# Patient Record
Sex: FEMALE | Race: WHITE | NOT HISPANIC OR LATINO | Employment: UNEMPLOYED | URBAN - METROPOLITAN AREA
[De-identification: names, ages, dates, MRNs, and addresses within clinical notes are randomized per-mention and may not be internally consistent; named-entity substitution may affect disease eponyms.]

---

## 2022-12-23 ENCOUNTER — ANESTHESIA (OUTPATIENT)
Dept: OBGYN | Facility: MEDICAL CENTER | Age: 32
End: 2022-12-23
Payer: MEDICAID

## 2022-12-23 ENCOUNTER — ANESTHESIA EVENT (OUTPATIENT)
Dept: OBGYN | Facility: MEDICAL CENTER | Age: 32
End: 2022-12-23
Payer: MEDICAID

## 2022-12-23 ENCOUNTER — HOSPITAL ENCOUNTER (INPATIENT)
Facility: MEDICAL CENTER | Age: 32
LOS: 3 days | End: 2022-12-26
Attending: OBSTETRICS & GYNECOLOGY | Admitting: OBSTETRICS & GYNECOLOGY
Payer: MEDICAID

## 2022-12-23 DIAGNOSIS — G89.18 ACUTE POSTOPERATIVE PAIN: ICD-10-CM

## 2022-12-23 PROBLEM — Z98.891 STATUS POST REPEAT LOW TRANSVERSE CESAREAN SECTION: Status: ACTIVE | Noted: 2022-12-23

## 2022-12-23 LAB
BASOPHILS # BLD AUTO: 0.5 % (ref 0–1.8)
BASOPHILS # BLD: 0.04 K/UL (ref 0–0.12)
EOSINOPHIL # BLD AUTO: 0.02 K/UL (ref 0–0.51)
EOSINOPHIL NFR BLD: 0.2 % (ref 0–6.9)
ERYTHROCYTE [DISTWIDTH] IN BLOOD BY AUTOMATED COUNT: 53 FL (ref 35.9–50)
HCT VFR BLD AUTO: 43.7 % (ref 37–47)
HGB BLD-MCNC: 14.6 G/DL (ref 12–16)
HOLDING TUBE BB 8507: NORMAL
IMM GRANULOCYTES # BLD AUTO: 0.03 K/UL (ref 0–0.11)
IMM GRANULOCYTES NFR BLD AUTO: 0.4 % (ref 0–0.9)
LYMPHOCYTES # BLD AUTO: 0.99 K/UL (ref 1–4.8)
LYMPHOCYTES NFR BLD: 12.1 % (ref 22–41)
MCH RBC QN AUTO: 33.6 PG (ref 27–33)
MCHC RBC AUTO-ENTMCNC: 33.4 G/DL (ref 33.6–35)
MCV RBC AUTO: 100.7 FL (ref 81.4–97.8)
MONOCYTES # BLD AUTO: 0.47 K/UL (ref 0–0.85)
MONOCYTES NFR BLD AUTO: 5.7 % (ref 0–13.4)
NEUTROPHILS # BLD AUTO: 6.63 K/UL (ref 2–7.15)
NEUTROPHILS NFR BLD: 81.1 % (ref 44–72)
NRBC # BLD AUTO: 0 K/UL
NRBC BLD-RTO: 0 /100 WBC
PLATELET # BLD AUTO: 168 K/UL (ref 164–446)
PMV BLD AUTO: 12 FL (ref 9–12.9)
RBC # BLD AUTO: 4.34 M/UL (ref 4.2–5.4)
T PALLIDUM AB SER QL IA: NORMAL
WBC # BLD AUTO: 8.2 K/UL (ref 4.8–10.8)

## 2022-12-23 PROCEDURE — 160035 HCHG PACU - 1ST 60 MINS PHASE I: Performed by: OBSTETRICS & GYNECOLOGY

## 2022-12-23 PROCEDURE — 86780 TREPONEMA PALLIDUM: CPT

## 2022-12-23 PROCEDURE — 700111 HCHG RX REV CODE 636 W/ 250 OVERRIDE (IP)

## 2022-12-23 PROCEDURE — 700101 HCHG RX REV CODE 250: Performed by: ANESTHESIOLOGY

## 2022-12-23 PROCEDURE — 700111 HCHG RX REV CODE 636 W/ 250 OVERRIDE (IP): Performed by: OBSTETRICS & GYNECOLOGY

## 2022-12-23 PROCEDURE — 700111 HCHG RX REV CODE 636 W/ 250 OVERRIDE (IP): Performed by: NURSE PRACTITIONER

## 2022-12-23 PROCEDURE — 36415 COLL VENOUS BLD VENIPUNCTURE: CPT

## 2022-12-23 PROCEDURE — 160041 HCHG SURGERY MINUTES - EA ADDL 1 MIN LEVEL 4: Performed by: OBSTETRICS & GYNECOLOGY

## 2022-12-23 PROCEDURE — 770002 HCHG ROOM/CARE - OB PRIVATE (112)

## 2022-12-23 PROCEDURE — 59514 CESAREAN DELIVERY ONLY: CPT | Performed by: OBSTETRICS & GYNECOLOGY

## 2022-12-23 PROCEDURE — 700102 HCHG RX REV CODE 250 W/ 637 OVERRIDE(OP): Performed by: ANESTHESIOLOGY

## 2022-12-23 PROCEDURE — 160048 HCHG OR STATISTICAL LEVEL 1-5: Performed by: OBSTETRICS & GYNECOLOGY

## 2022-12-23 PROCEDURE — 160029 HCHG SURGERY MINUTES - 1ST 30 MINS LEVEL 4: Performed by: OBSTETRICS & GYNECOLOGY

## 2022-12-23 PROCEDURE — 700111 HCHG RX REV CODE 636 W/ 250 OVERRIDE (IP): Performed by: ANESTHESIOLOGY

## 2022-12-23 PROCEDURE — 99282 EMERGENCY DEPT VISIT SF MDM: CPT

## 2022-12-23 PROCEDURE — 700105 HCHG RX REV CODE 258: Performed by: ANESTHESIOLOGY

## 2022-12-23 PROCEDURE — 700102 HCHG RX REV CODE 250 W/ 637 OVERRIDE(OP)

## 2022-12-23 PROCEDURE — C1755 CATHETER, INTRASPINAL: HCPCS | Performed by: OBSTETRICS & GYNECOLOGY

## 2022-12-23 PROCEDURE — 85025 COMPLETE CBC W/AUTO DIFF WBC: CPT

## 2022-12-23 PROCEDURE — 01961 ANES CESAREAN DELIVERY ONLY: CPT | Performed by: ANESTHESIOLOGY

## 2022-12-23 PROCEDURE — 160002 HCHG RECOVERY MINUTES (STAT): Performed by: OBSTETRICS & GYNECOLOGY

## 2022-12-23 PROCEDURE — A9270 NON-COVERED ITEM OR SERVICE: HCPCS

## 2022-12-23 PROCEDURE — 160009 HCHG ANES TIME/MIN: Performed by: OBSTETRICS & GYNECOLOGY

## 2022-12-23 PROCEDURE — A9270 NON-COVERED ITEM OR SERVICE: HCPCS | Performed by: ANESTHESIOLOGY

## 2022-12-23 RX ORDER — MEPERIDINE HYDROCHLORIDE 25 MG/ML
25 INJECTION INTRAMUSCULAR; INTRAVENOUS; SUBCUTANEOUS ONCE
Status: COMPLETED | OUTPATIENT
Start: 2022-12-23 | End: 2022-12-23

## 2022-12-23 RX ORDER — DIPHENHYDRAMINE HCL 25 MG
25 TABLET ORAL EVERY 6 HOURS PRN
Status: DISCONTINUED | OUTPATIENT
Start: 2022-12-23 | End: 2022-12-26 | Stop reason: HOSPADM

## 2022-12-23 RX ORDER — SODIUM CHLORIDE, SODIUM GLUCONATE, SODIUM ACETATE, POTASSIUM CHLORIDE AND MAGNESIUM CHLORIDE 526; 502; 368; 37; 30 MG/100ML; MG/100ML; MG/100ML; MG/100ML; MG/100ML
INJECTION, SOLUTION INTRAVENOUS
Status: DISCONTINUED | OUTPATIENT
Start: 2022-12-23 | End: 2022-12-23 | Stop reason: SURG

## 2022-12-23 RX ORDER — DEXAMETHASONE SODIUM PHOSPHATE 4 MG/ML
INJECTION, SOLUTION INTRA-ARTICULAR; INTRALESIONAL; INTRAMUSCULAR; INTRAVENOUS; SOFT TISSUE PRN
Status: DISCONTINUED | OUTPATIENT
Start: 2022-12-23 | End: 2022-12-23 | Stop reason: SURG

## 2022-12-23 RX ORDER — CEFAZOLIN SODIUM 1 G/3ML
2 INJECTION, POWDER, FOR SOLUTION INTRAMUSCULAR; INTRAVENOUS ONCE
Status: COMPLETED | OUTPATIENT
Start: 2022-12-23 | End: 2022-12-23

## 2022-12-23 RX ORDER — OXYCODONE HYDROCHLORIDE 5 MG/1
5 TABLET ORAL EVERY 4 HOURS PRN
Status: DISCONTINUED | OUTPATIENT
Start: 2022-12-23 | End: 2022-12-25

## 2022-12-23 RX ORDER — SCOLOPAMINE TRANSDERMAL SYSTEM 1 MG/1
1 PATCH, EXTENDED RELEASE TRANSDERMAL
Status: DISCONTINUED | OUTPATIENT
Start: 2022-12-23 | End: 2022-12-26 | Stop reason: HOSPADM

## 2022-12-23 RX ORDER — SODIUM CHLORIDE, SODIUM LACTATE, POTASSIUM CHLORIDE, CALCIUM CHLORIDE 600; 310; 30; 20 MG/100ML; MG/100ML; MG/100ML; MG/100ML
INJECTION, SOLUTION INTRAVENOUS CONTINUOUS
Status: DISCONTINUED | OUTPATIENT
Start: 2022-12-23 | End: 2022-12-26 | Stop reason: HOSPADM

## 2022-12-23 RX ORDER — OXYTOCIN 10 [USP'U]/ML
10 INJECTION, SOLUTION INTRAMUSCULAR; INTRAVENOUS
Status: COMPLETED | OUTPATIENT
Start: 2022-12-23 | End: 2022-12-23

## 2022-12-23 RX ORDER — HALOPERIDOL 5 MG/ML
1 INJECTION INTRAMUSCULAR
Status: DISCONTINUED | OUTPATIENT
Start: 2022-12-23 | End: 2022-12-23 | Stop reason: HOSPADM

## 2022-12-23 RX ORDER — BUPIVACAINE HYDROCHLORIDE 7.5 MG/ML
INJECTION, SOLUTION INTRASPINAL
Status: COMPLETED | OUTPATIENT
Start: 2022-12-23 | End: 2022-12-23

## 2022-12-23 RX ORDER — DIPHENHYDRAMINE HYDROCHLORIDE 50 MG/ML
25 INJECTION INTRAMUSCULAR; INTRAVENOUS EVERY 6 HOURS PRN
Status: DISCONTINUED | OUTPATIENT
Start: 2022-12-23 | End: 2022-12-26 | Stop reason: HOSPADM

## 2022-12-23 RX ORDER — SODIUM CHLORIDE, SODIUM LACTATE, POTASSIUM CHLORIDE, CALCIUM CHLORIDE 600; 310; 30; 20 MG/100ML; MG/100ML; MG/100ML; MG/100ML
INJECTION, SOLUTION INTRAVENOUS PRN
Status: DISCONTINUED | OUTPATIENT
Start: 2022-12-23 | End: 2022-12-26 | Stop reason: HOSPADM

## 2022-12-23 RX ORDER — SODIUM CHLORIDE, SODIUM LACTATE, POTASSIUM CHLORIDE, CALCIUM CHLORIDE 600; 310; 30; 20 MG/100ML; MG/100ML; MG/100ML; MG/100ML
INJECTION, SOLUTION INTRAVENOUS ONCE
Status: ACTIVE | OUTPATIENT
Start: 2022-12-23 | End: 2022-12-24

## 2022-12-23 RX ORDER — IBUPROFEN 800 MG/1
800 TABLET ORAL EVERY 8 HOURS
Status: COMPLETED | OUTPATIENT
Start: 2022-12-23 | End: 2022-12-26

## 2022-12-23 RX ORDER — ONDANSETRON 2 MG/ML
4 INJECTION INTRAMUSCULAR; INTRAVENOUS EVERY 4 HOURS PRN
Status: DISCONTINUED | OUTPATIENT
Start: 2022-12-23 | End: 2022-12-23

## 2022-12-23 RX ORDER — DOCUSATE SODIUM 100 MG/1
100 CAPSULE, LIQUID FILLED ORAL 2 TIMES DAILY PRN
Status: DISCONTINUED | OUTPATIENT
Start: 2022-12-23 | End: 2022-12-26 | Stop reason: HOSPADM

## 2022-12-23 RX ORDER — METOCLOPRAMIDE HYDROCHLORIDE 5 MG/ML
10 INJECTION INTRAMUSCULAR; INTRAVENOUS ONCE
Status: DISCONTINUED | OUTPATIENT
Start: 2022-12-23 | End: 2022-12-23 | Stop reason: HOSPADM

## 2022-12-23 RX ORDER — DIPHENHYDRAMINE HYDROCHLORIDE 50 MG/ML
12.5 INJECTION INTRAMUSCULAR; INTRAVENOUS
Status: DISCONTINUED | OUTPATIENT
Start: 2022-12-23 | End: 2022-12-23 | Stop reason: HOSPADM

## 2022-12-23 RX ORDER — KETOROLAC TROMETHAMINE 30 MG/ML
30 INJECTION, SOLUTION INTRAMUSCULAR; INTRAVENOUS ONCE
Status: COMPLETED | OUTPATIENT
Start: 2022-12-23 | End: 2022-12-23

## 2022-12-23 RX ORDER — ONDANSETRON 2 MG/ML
INJECTION INTRAMUSCULAR; INTRAVENOUS
Status: COMPLETED
Start: 2022-12-23 | End: 2022-12-23

## 2022-12-23 RX ORDER — ONDANSETRON 2 MG/ML
4 INJECTION INTRAMUSCULAR; INTRAVENOUS EVERY 6 HOURS PRN
Status: DISCONTINUED | OUTPATIENT
Start: 2022-12-23 | End: 2022-12-26 | Stop reason: HOSPADM

## 2022-12-23 RX ORDER — ONDANSETRON 4 MG/1
4 TABLET, ORALLY DISINTEGRATING ORAL ONCE
Status: COMPLETED | OUTPATIENT
Start: 2022-12-23 | End: 2022-12-23

## 2022-12-23 RX ORDER — MORPHINE SULFATE 0.5 MG/ML
INJECTION, SOLUTION EPIDURAL; INTRATHECAL; INTRAVENOUS
Status: DISCONTINUED | OUTPATIENT
Start: 2022-12-23 | End: 2022-12-23

## 2022-12-23 RX ORDER — PHENYLEPHRINE HYDROCHLORIDE 10 MG/ML
INJECTION, SOLUTION INTRAMUSCULAR; INTRAVENOUS; SUBCUTANEOUS PRN
Status: DISCONTINUED | OUTPATIENT
Start: 2022-12-23 | End: 2022-12-23 | Stop reason: SURG

## 2022-12-23 RX ORDER — ACETAMINOPHEN 500 MG
1000 TABLET ORAL EVERY 6 HOURS PRN
Status: DISCONTINUED | OUTPATIENT
Start: 2022-12-26 | End: 2022-12-26 | Stop reason: HOSPADM

## 2022-12-23 RX ORDER — IBUPROFEN 800 MG/1
800 TABLET ORAL EVERY 8 HOURS PRN
Status: DISCONTINUED | OUTPATIENT
Start: 2022-12-26 | End: 2022-12-26 | Stop reason: HOSPADM

## 2022-12-23 RX ORDER — OXYCODONE HYDROCHLORIDE 10 MG/1
10 TABLET ORAL EVERY 4 HOURS PRN
Status: DISCONTINUED | OUTPATIENT
Start: 2022-12-23 | End: 2022-12-25

## 2022-12-23 RX ORDER — CITRIC ACID/SODIUM CITRATE 334-500MG
30 SOLUTION, ORAL ORAL ONCE
Status: COMPLETED | OUTPATIENT
Start: 2022-12-23 | End: 2022-12-23

## 2022-12-23 RX ORDER — ONDANSETRON 4 MG/1
4 TABLET, ORALLY DISINTEGRATING ORAL EVERY 6 HOURS PRN
Status: DISCONTINUED | OUTPATIENT
Start: 2022-12-23 | End: 2022-12-26 | Stop reason: HOSPADM

## 2022-12-23 RX ORDER — ONDANSETRON 2 MG/ML
4 INJECTION INTRAMUSCULAR; INTRAVENOUS
Status: DISCONTINUED | OUTPATIENT
Start: 2022-12-23 | End: 2022-12-23 | Stop reason: HOSPADM

## 2022-12-23 RX ORDER — SODIUM CHLORIDE, SODIUM GLUCONATE, SODIUM ACETATE, POTASSIUM CHLORIDE AND MAGNESIUM CHLORIDE 526; 502; 368; 37; 30 MG/100ML; MG/100ML; MG/100ML; MG/100ML; MG/100ML
1500 INJECTION, SOLUTION INTRAVENOUS ONCE
Status: COMPLETED | OUTPATIENT
Start: 2022-12-23 | End: 2022-12-23

## 2022-12-23 RX ORDER — ACETAMINOPHEN 500 MG
1000 TABLET ORAL EVERY 6 HOURS
Status: DISCONTINUED | OUTPATIENT
Start: 2022-12-23 | End: 2022-12-26 | Stop reason: HOSPADM

## 2022-12-23 RX ADMIN — SODIUM CHLORIDE, SODIUM GLUCONATE, SODIUM ACETATE, POTASSIUM CHLORIDE AND MAGNESIUM CHLORIDE 1500 ML: 526; 502; 368; 37; 30 INJECTION, SOLUTION INTRAVENOUS at 11:27

## 2022-12-23 RX ADMIN — ACETAMINOPHEN 1000 MG: 500 TABLET ORAL at 23:57

## 2022-12-23 RX ADMIN — OXYCODONE HYDROCHLORIDE 10 MG: 10 TABLET ORAL at 17:55

## 2022-12-23 RX ADMIN — FAMOTIDINE 20 MG: 10 INJECTION, SOLUTION INTRAVENOUS at 09:46

## 2022-12-23 RX ADMIN — ACETAMINOPHEN 1000 MG: 500 TABLET ORAL at 17:56

## 2022-12-23 RX ADMIN — SCOPOLAMINE 1 PATCH: 1.5 PATCH, EXTENDED RELEASE TRANSDERMAL at 11:27

## 2022-12-23 RX ADMIN — FENTANYL CITRATE 100 MCG: 50 INJECTION INTRAMUSCULAR; INTRAVENOUS at 10:35

## 2022-12-23 RX ADMIN — EPHEDRINE SULFATE 10 MG: 50 INJECTION INTRAMUSCULAR; INTRAVENOUS; SUBCUTANEOUS at 13:11

## 2022-12-23 RX ADMIN — PHENYLEPHRINE HYDROCHLORIDE 100 MCG: 10 INJECTION INTRAVENOUS at 13:17

## 2022-12-23 RX ADMIN — ONDANSETRON 4 MG: 2 INJECTION INTRAMUSCULAR; INTRAVENOUS at 09:23

## 2022-12-23 RX ADMIN — FENTANYL CITRATE 100 MCG: 50 INJECTION, SOLUTION INTRAMUSCULAR; INTRAVENOUS at 09:23

## 2022-12-23 RX ADMIN — OXYTOCIN 10 UNITS: 10 INJECTION, SOLUTION INTRAMUSCULAR; INTRAVENOUS at 15:05

## 2022-12-23 RX ADMIN — PHENYLEPHRINE HYDROCHLORIDE 100 MCG: 10 INJECTION INTRAVENOUS at 13:15

## 2022-12-23 RX ADMIN — CEFAZOLIN 2 G: 330 INJECTION, POWDER, FOR SOLUTION INTRAMUSCULAR; INTRAVENOUS at 13:14

## 2022-12-23 RX ADMIN — DEXAMETHASONE SODIUM PHOSPHATE 4 MG: 4 INJECTION, SOLUTION INTRA-ARTICULAR; INTRALESIONAL; INTRAMUSCULAR; INTRAVENOUS; SOFT TISSUE at 13:52

## 2022-12-23 RX ADMIN — PHENYLEPHRINE HYDROCHLORIDE 200 MCG: 10 INJECTION INTRAVENOUS at 13:36

## 2022-12-23 RX ADMIN — KETOROLAC TROMETHAMINE 30 MG: 30 INJECTION, SOLUTION INTRAMUSCULAR; INTRAVENOUS at 14:47

## 2022-12-23 RX ADMIN — PHENYLEPHRINE HYDROCHLORIDE 200 MCG: 10 INJECTION INTRAVENOUS at 13:19

## 2022-12-23 RX ADMIN — SODIUM CHLORIDE, SODIUM GLUCONATE, SODIUM ACETATE, POTASSIUM CHLORIDE AND MAGNESIUM CHLORIDE: 526; 502; 368; 37; 30 INJECTION, SOLUTION INTRAVENOUS at 13:04

## 2022-12-23 RX ADMIN — BUPIVACAINE HYDROCHLORIDE IN DEXTROSE 1.8 ML: 7.5 INJECTION, SOLUTION SUBARACHNOID at 13:06

## 2022-12-23 RX ADMIN — MEPERIDINE HYDROCHLORIDE 25 MG: 25 INJECTION INTRAMUSCULAR; INTRAVENOUS; SUBCUTANEOUS at 15:04

## 2022-12-23 RX ADMIN — FENTANYL CITRATE 100 MCG: 50 INJECTION INTRAMUSCULAR; INTRAVENOUS at 12:11

## 2022-12-23 RX ADMIN — ONDANSETRON 4 MG: 4 TABLET, ORALLY DISINTEGRATING ORAL at 07:11

## 2022-12-23 RX ADMIN — OXYTOCIN 500 ML: 10 INJECTION, SOLUTION INTRAMUSCULAR; INTRAVENOUS at 13:31

## 2022-12-23 RX ADMIN — FENTANYL CITRATE 100 MCG: 50 INJECTION INTRAMUSCULAR; INTRAVENOUS at 09:23

## 2022-12-23 RX ADMIN — SODIUM CITRATE AND CITRIC ACID MONOHYDRATE 30 ML: 500; 334 SOLUTION ORAL at 09:46

## 2022-12-23 RX ADMIN — PHENYLEPHRINE HYDROCHLORIDE 100 MCG: 10 INJECTION INTRAVENOUS at 13:14

## 2022-12-23 RX ADMIN — IBUPROFEN 800 MG: 800 TABLET, FILM COATED ORAL at 21:06

## 2022-12-23 ASSESSMENT — LIFESTYLE VARIABLES
EVER_SMOKED: NEVER
TOTAL SCORE: 0
ALCOHOL_USE: NO
ON A TYPICAL DAY WHEN YOU DRINK ALCOHOL HOW MANY DRINKS DO YOU HAVE: 0
HOW MANY TIMES IN THE PAST YEAR HAVE YOU HAD 5 OR MORE DRINKS IN A DAY: 0
HAVE YOU EVER FELT YOU SHOULD CUT DOWN ON YOUR DRINKING: NO
CONSUMPTION TOTAL: NEGATIVE
EVER HAD A DRINK FIRST THING IN THE MORNING TO STEADY YOUR NERVES TO GET RID OF A HANGOVER: NO
AVERAGE NUMBER OF DAYS PER WEEK YOU HAVE A DRINK CONTAINING ALCOHOL: 0
EVER FELT BAD OR GUILTY ABOUT YOUR DRINKING: NO
TOTAL SCORE: 0
HAVE PEOPLE ANNOYED YOU BY CRITICIZING YOUR DRINKING: NO
TOTAL SCORE: 0

## 2022-12-23 ASSESSMENT — PATIENT HEALTH QUESTIONNAIRE - PHQ9
2. FEELING DOWN, DEPRESSED, IRRITABLE, OR HOPELESS: NOT AT ALL
SUM OF ALL RESPONSES TO PHQ9 QUESTIONS 1 AND 2: 0
1. LITTLE INTEREST OR PLEASURE IN DOING THINGS: NOT AT ALL

## 2022-12-23 ASSESSMENT — COPD QUESTIONNAIRES
DURING THE PAST 4 WEEKS HOW MUCH DID YOU FEEL SHORT OF BREATH: NONE/LITTLE OF THE TIME
DO YOU EVER COUGH UP ANY MUCUS OR PHLEGM?: NO/ONLY WITH OCCASIONAL COLDS OR INFECTIONS
IN THE PAST 12 MONTHS DO YOU DO LESS THAN YOU USED TO BECAUSE OF YOUR BREATHING PROBLEMS: DISAGREE/UNSURE
HAVE YOU SMOKED AT LEAST 100 CIGARETTES IN YOUR ENTIRE LIFE: NO/DON'T KNOW

## 2022-12-23 ASSESSMENT — PAIN SCALES - GENERAL: PAINLEVEL: 5 - MODERATE PAIN

## 2022-12-23 ASSESSMENT — PAIN DESCRIPTION - PAIN TYPE
TYPE: SURGICAL PAIN
TYPE: ACUTE PAIN
TYPE: SURGICAL PAIN

## 2022-12-23 ASSESSMENT — FIBROSIS 4 INDEX: FIB4 SCORE: 0.72

## 2022-12-23 NOTE — PROGRESS NOTES
0905- Report received from SHERI Voss. POC discussed. Pt stable at this time.    0945- Pt reports adverse effects when taking reglan, Dr. Chavira notified and gave orders to no give the medication.    1030- Dr. Levine gave okay to give another dose of 100 mcg of fentanyl.     1115- Orders from Dr. Chavira to apply scopolamine patch to help with pt nausea. Patch applied behind the Left ear.    1207- Dr. Chavira gave okay for pt to have another dose of 100 mcg of fentanyl.     1335- C/S delivery of viable baby girl, APGARS 4/9. Cord gases collected and sent.     1500- Dr. Chavira gave orders for 30 mg toradol and 25 mg demerol IV at this time.     1506- Dr. Levine updated related to unable to get a new IV. Dr. Levine said it was okay at this time to not run pit IV and to give 10 Units IM.    1700- Pt transferred to postpartum via gurney. Report given to SHERI Burns. POC discussed. Pt stable. Cuddles and bands verified.

## 2022-12-23 NOTE — OP REPORT
PREOPERATIVE DIAGNOSIS:   IUP @ 40 1/7 weeks   Previous c/section  Latent labor with no cervical change  Nausea and vomiting with diarrhea  Poor tolerance of CTXs  Desires to proceed with repeat c/section    POSTOPERATIVE DIAGNOSIS:  Same    PROCEDURE: Repeat Low Transverse  Section via Pfannensteil    SURGEON: Amrita Levine MD    ASSISTANT: Dr michelle Snow    ANESTHESIA: Dr Maged Chavira/ Spinal    COMPLICATIONS: none    EBL:  500 cc    FLUIDS: 1000 cc LR    URINE OUTPUT: 300 cc clear urine    INDICATIONS: see above    FINDINGS: Viable female infant in vertex presentation with clear fluid, apgars 4 and 9, weight 7lbs 9oz. Normal uterus, tubes, ovaries.     PROCEDURE IN DETAIL: The patient was taken to the operating room where  spinal anesthesia was found to be adequate.  She was then prepped and draped in the normal sterile fashion in the dorsal supine position with a leftward hip tilt.  A Pfannenstiel skin incision was then made with the scalpel and carried through to the underlying layer of fascia, which was nicked in the midline and the incision was extended laterally with the Whitney scissors.  The superior aspect of the fascial incision was then grasped with Kocher clamps, elevated, and the underlying rectus muscles dissected off sharply.  Attention was then turned to the inferior aspect of this incision which, in a similar fashion, was grasped, tented up with Kocher clamps, and the rectus muscle was dissected off sharply.  The rectus muscles were then  in the midline, and the peritoneum was identified, tented up, and entered sharply with the Metzenbaum scissors.  The peritoneal incision was then extended superiorly and inferiorly with good visualization of the bladder.  The Arnold-O retractor was placed, checked for safe positioning, and tightened into place  and the vesicouterine peritoneum identified, grasped with pickups, and entered sharply with the Metzenbaum scissors.  This incision was  then extended laterally and the bladder flap was created digitally.  The lower uterine segment incised in a transverse fashion with the scalpel.  The uterine incision was then extended manually in a cephalocaudad direction with fingers.  The infant's head was delivered atraumatically.  The remainder of the infant was delivered without difficulty.  The infant's nose and mouth were bulb suctioned on the field.  The infant had adequate tone and movement and opening of eyes on abdomen. The cord was clamped and cut after approximately 30 seconds.  The infant was then handed off to the awaiting attendant.    The placenta was then removed manually, the uterus exteriorized, and cleared of all clot and debris with a dry laparotomy sponge.  The uterine incision was repaired with 1-0 chromic in a running, locked fashion.  A second layer of the same suture was used to obtain excellent hemostasis.  The uterus was returned to the abdomen and the gutters were cleared of all clot and debris.  The peritoneum and rectus muscles were fused and closed together was closed with 3-0 Vicryl.  The fascia was approximated with 0 Vicryl in a running fashion.  The subcuticular fat was irrigated.  Yunior's fascia was closed with interrupted sutures of 2-0 Vicryl.  The skin was closed with 4-0 monocryl on a Juan needle. Mepelex dressing applied.  The patient tolerated the procedure well.  Sponge lap and needle counts were correct x3.  The patient was taken to the recovery room in stable condition.

## 2022-12-23 NOTE — ANESTHESIA PREPROCEDURE EVALUATION
Case: [id number] Date/Time: 22 1000    Procedure:  SECTION, REPEAT (Abdomen)    Anesthesia type: Spinal    Pre-op diagnosis: Hx of previous c section    Location: [facility] OR 02 / SURGERY LABOR AND DELIVERY    Surgeons: Amrita Levine M.D.          Relevant Problems   OB   (positive) Status post repeat low transverse  section      Other   (positive) Hyperemesis arising during pregnancy       Physical Exam    Airway   Mallampati: II  TM distance: >3 FB  Neck ROM: full       Cardiovascular - normal exam  Rhythm: regular  Rate: normal  (-) murmur     Dental - normal exam           Pulmonary - normal exam  Breath sounds clear to auscultation     Abdominal    Neurological - normal exam                 Anesthesia Plan    ASA 2       Plan - spinal   Neuraxial block will be primary anesthetic                  Pertinent diagnostic labs and testing reviewed    Informed Consent:    Anesthetic plan and risks discussed with patient.

## 2022-12-23 NOTE — ED PROVIDER NOTES
"S: Pt is a 32 y.o.  at Unknown with Estimated Date of Delivery: None noted. who presents to triage c/o RUC's since 030.  Denies VB or LOF.  Reports good FM. PNC has been at [facility]. She has been talking to her providers about a TOLAC, but also was informed that CMG and CTH do not support TOLAC. Hx of  section x  for FITL and FTD. No operative report, states baby was [id number]. Denies any other complications. She came to [facility] for a desired TOLAC. Will discuss with oncvalery STEWART for POC. She is not opposed to a repeat  section either.    O: /71   Pulse 80   Temp 36.1 °C (96.9 °F) (Temporal)   Ht 1.676 m (5' 6\")   Wt 90.7 kg (200 lb)   LMP 2022   BMI 32.28 kg/m²          NST: Done and read by me         Indication: Term IUP, rule out labor. Prev C/S x 1       FHR: 125       Variability: moderate       Acclerations: present       Decelerations: absent       Reactive: yes         [place]: irreg UCs, every 2-4       SVE: ft/80/-3, monitored x 1 hour, and unchanged.    After recheck, patient became very uncomfortable with nausea and vomiting.     Reviewed indications for admission and POC  As her PNC has been with another provider, and I do not have record of her previous , I do not recommend TOLAC or augmentation of labor.  After discussion, she is agreeable to repeat . Will discuss with oncoming provider         A/P  Patient Active Problem List    Diagnosis Date Noted    GBS (group B Streptococcus carrier), +RV culture, currently pregnant 2014    Hypokalemia 2014    Diarrhea 2014    N&V (nausea and vomiting) 2014    Hyperemesis gravidarum 2014    Hyperemesis complicating pregnancy, antepartum 2014    Hyperemesis arising during pregnancy 2014     Report to Dr Levine and Dr Snow- will resume care and decide disposition.     1.  IUP @ 40w1d per records  2.  Reactive NST.  3.  Early labor, hx of c/s x 1  4.  " Nausea and vomiting in pregnancy  5.  Defer to Dr Levine and Edy for disposition.    Toshia Zelaya CNM, APRN

## 2022-12-23 NOTE — H&P
History and Physical      Judy Darden is a 32 y.o. female  with EDC 22 by 13 week US and EGA 40 1/7 weeks  Presents with strong, regular uterine CTXs and nausea/vomiting and diarrhea which started on hospital presentation    Subjective:   positive  For CTXS.   positive Feels pain   negative for LOF  negative for vaginal bleeding.   positive for fetal movement    ROS: A comprehensive review of systems was negative. She denies S&S of PIH. Reports n/ving and diarrhea and painful/intolerable uterine CTXs. She reports active FM and denies LOF or vaginal bleeding    Past Medical History:   Diagnosis Date    Anxiety     Indigestion     No active medical problems     Patient denies medical problems     Pregnant     Psychiatric disorder     anxiety     Past Surgical History:   Procedure Laterality Date    PB KNEE SCOPE,REMV LOOSE BODY  oct 2007    OTHER  shingles , intestinal parasite     OTHER ORTHOPEDIC SURGERY      right knee   PRevious c/section for FTP (only dilated to 3 cm)    OB History:  1 X SAB  1 X term c/section of 7lb 11oz for FTP and possible fetal intolerance to labor    Allergies: Penicillins    Current Facility-Administered Medications:     oxytocin (Pitocin) 0.02 Units/mL in LR infusion, , , ,     electrolyte-A (PLASMALYTE-A) infusion 1,500 mL, 1,500 mL, Intravenous, Once, Alvin Chavira M.D.    Na citrate-citric acid (BICITRA) 500-334 MG/5ML solution 30 mL, 30 mL, Oral, Once, Alvin Chavira M.D.    famotidine (PEPCID) injection 20 mg, 20 mg, Intravenous, Once, Alvin Chavira M.D.    metoclopramide (REGLAN) injection 10 mg, 10 mg, Intravenous, Once, Alvin Chavira M.D.    lactated ringers (LR) infusion, , Intravenous, Continuous, Amrita Levine M.D.    ceFAZolin (ANCEF) injection 2 g, 2 g, Intravenous, Once, Amrita Levine M.D.    Prenatal care with [facility] starting at 13 with following problems:    Patient Active Problem List    Diagnosis Date Noted    Status post repeat low  "transverse  section 2022    GBS (group B Streptococcus carrier), +RV culture, currently pregnant 2014    Hypokalemia 2014    Diarrhea 2014    N&V (nausea and vomiting) 2014    Hyperemesis gravidarum 2014    Hyperemesis complicating pregnancy, antepartum 2014    Hyperemesis arising during pregnancy 2014         Objective:      /71   Pulse 80   Temp 36.1 °C (96.9 °F) (Temporal)   Ht 1.676 m (5' 6\")   Wt 90.7 kg (200 lb)     General:   Uncomfortable with emesis   Skin:   normal   HEENT:  PERRLA   Lungs:   CTA bilateral   Heart:   S1, S2 normal, no murmur, click, rub or gallop, regular rate and rhythm, chest is clear without rales or wheezing   Abdomen:   gravid, NT   EFW:  8 lbs       FHT:  120s BPM, + accels, moderate variability, no decels       Presentations: Cephalic   Cervix:     Dilation: FT- exam per RN and repeated after an hour    Effacement: Long    Station:  -3    Consistency: Firm    Position: Middle     Lab Review  Lab:   Blood type: O positive     Recent Results (from the past 5880 hour(s))   POCT Pregnancy    Collection Time: 22  9:40 AM   Result Value Ref Range    POC Urine Pregnancy Test Positive Negative    Internal Control Positive Positive     Internal Control Negative Negative    POCT Urinalysis    Collection Time: 22  9:40 AM   Result Value Ref Range    POC Color Yellow Negative    POC Appearance Clear Negative    POC Leukocyte Esterase Negative Negative    POC Bilirubin Negative Negative mg/dL    POC Urobiligen 0.2 E.U. dl Negative (0.2) mg/dL    POC Protein Negative Negative mg/dL    POC Urine PH 7.5 5.0 - 8.0    POC Blood Negative Negative    POC Specific Gravity 1.020 <1.005 - >1.030    POC Ketones Negative Negative mg/dL    POC Nitrites Negative Negative    POC Glucose Negative Negative mg/dL   CBC WITH PLATELET COUNT    Collection Time: 22 12:35 PM   Result Value Ref Range    Leukocytes, Absolute 7.2 3.7 - " 10.6 x10E3/uL    RBC 3.90 (L) 4.19 - 5.21 x10e6/uL    Hemoglobin 13.5 12.3 - 16.0 g/dL    Hematocrit 39.4 36.0 - 47.0 %    .9 (H) 81.0 - 99.0 fL    MCH 34.7 (H) 28.0 - 33.8 pg    MCHC 34.4 33.1 - 36.5 g/dL    RDW 12.6 11.8 - 14.0 %    Platelet Count 203 146 - 390 x10E3/uL    MPV 9.0 6.4 - 10.2 fL   HEP C VIRUS ANTIBODY    Collection Time: 06/29/22 12:54 PM   Result Value Ref Range    Hepatitis C virus IgG Ab (Units/volume) in Serum by Immunoassay External Nonreactive Nonreactive   HEPATITIS B VIRUS SURFACE ANTIGEN WITH REFLEX TO CONFIRMATION    Collection Time: 06/29/22 12:54 PM   Result Value Ref Range    Hepatitis B Surface Antigen Nonreactive Nonreactive   COMPLETE CBC & AUTO DIFF WBC    Collection Time: 11/03/22  8:48 AM   Result Value Ref Range    Leukocytes, Absolute 8.3 3.7 - 10.6 x10E3/uL    RBC 3.96 (L) 4.19 - 5.21 x10e6/uL    Hemoglobin 13.4 12.3 - 16.0 g/dL    Hematocrit 39.2 36.0 - 47.0 %    MCV 98.9 81.0 - 99.0 fL    MCH 33.8 28.0 - 33.8 pg    MCHC 34.2 33.1 - 36.5 g/dL    RDW 13.2 11.8 - 14.0 %    Platelet Count 204 146 - 390 x10E3/uL    MPV 9.0 6.4 - 10.2 fL    Neutrophils-Polys 78.1 41.7 - 82.3 %    Lymphocytes 16.4 10.8-<44.4 %    Monocytes 4.0 (L) 5.0 - 12.8 %    Eosinophils 0.9 0.0 - 6.6 %    Basophils 0.6 0.0 - 1.3 %    Neutrophils (Absolute) 6.50 1.40 - 8.00 x10E3/uL    Lymphs (Absolute) 1.40 1.00 - 5.20 x10E3/uL    Monos (Absolute) 0.30 0.16 - 1.00 x10E3/uL    Eos (Absolute) 0.10 0.00 - 0.80 x10E3/uL    Baso (Absolute) 0.10 0.00 - 0.30 x10E3/uL   COMP METABOLIC PANEL    Collection Time: 11/17/22 12:52 PM   Result Value Ref Range    Sodium 132 (L) 136 - 144 mmol/L    Potassium 4.0 3.6 - 5.1 mmol/L    Chloride 104 102 - 110 mmol/L    Co2 18 (L) 22 - 32 mmol/L    Alkaline Phosphatase 96 38 - 126 U/L    AST(SGOT) 17 15 - 41 U/L    ALT(SGPT) 13 (L) 14 - 54 U/L    Bun 9 8 - 20 mg/dL    Creatinine 0.78 0.60 - 1.10 mg/dL    Calcium 8.7 8.7 - 10.3 mg/dL    Total Protein 6.4 6.4 - 8.2 g/dL     Albumin 3.5 3.5 - 4.8 g/dL    Ag Ratio 1.2 1.0 - 2.2 ratio    Anion Gap 10 2 - 11 mmol/L    Glom Filt Rate, Est 85 >60 mL/min/1.7    Globulin 2.9 2.6 - 3.1 g/dL    Glucose 75 60 - 99 mg/dL    Total Bilirubin 0.6 0.4 - 2.0 mg/dL   COMPLETE CBC & AUTO DIFF WBC    Collection Time: 22 12:52 PM   Result Value Ref Range    Leukocytes, Absolute 8.8 3.7 - 10.6 x10E3/uL    RBC 4.17 (L) 4.19 - 5.21 x10e6/uL    Hemoglobin 13.9 12.3 - 16.0 g/dL    Hematocrit 39.8 36.0 - 47.0 %    MCV 95.4 81.0 - 99.0 fL    MCH 33.2 28.0 - 33.8 pg    MCHC 34.8 33.1 - 36.5 g/dL    RDW 13.4 11.8 - 14.0 %    Platelet Count 215 146 - 390 x10E3/uL    MPV 9.0 6.4 - 10.2 fL    Neutrophils-Polys 78.8 41.7 - 82.3 %    Lymphocytes 13.2 10.8 - 44.4 %    Monocytes 6.4 5.0 - 12.8 %    Eosinophils 0.5 0.0 - 6.6 %    Basophils 1.1 0.0 - 1.3 %    Neutrophils (Absolute) 7.00 1.40 - 8.00 x10E3/uL    Lymphs (Absolute) 1.20 1.00 - 5.20 x10E3/uL    Monos (Absolute) 0.60 0.16 - 1.00 x10E3/uL    Eos (Absolute) 0.00 0.00 - 0.80 x10E3/uL    Baso (Absolute) 0.10 0.00 - 0.30 x10E3/uL   HEMOGLOBIN A1C    Collection Time: 22 11:04 AM   Result Value Ref Range    Glycohemoglobin 5.3 4.2 - 5.8 %        Assessment:   Judy Darden at 40 1/7 weeks  Labor status: Early latent labor.  Obstetrical history significant for previous c/section for FTP and    Patient Active Problem List    Diagnosis Date Noted    Status post repeat low transverse  section 2022    GBS (group B Streptococcus carrier), +RV culture, currently pregnant 2014    Hypokalemia 2014    Diarrhea 2014    N&V (nausea and vomiting) 2014    Hyperemesis gravidarum 2014    Hyperemesis complicating pregnancy, antepartum 2014    Hyperemesis arising during pregnancy 2014   .      Plan:     Admit to L&D  GBS negative    Discussed options at this time with patient and  and early latent labor that she is not tolerating very well with n/ving and  significant pain requesting pain medications. Discussed previous c/section and indications and at this time she desires to proceed with repeat c/s.   Discussed with the patient indications for  delivery. The patient voiced understanding of indications for  section at this time.    Discussed with the patient the risks of  delivery. The risks include bleeding, infection, transfusion, emergency hysterectomy to control bleeding, damage to surrounding organs (bowel, bladder, ureters, nerves, vessels), need for repair or future surgery, fetal injury, unexpected pathology, anesthesia risks, and rarely death.  I also discussed with the patient the risk of wound infection, wound breakdown, and scarring. We discussed that these risks are greater in people that have diabetes or obesity.    The patient had the opportunity to ask questions regarding the procedure. All questions answered to the patient's satisfaction. Plan to proceed with  delivery.      [employer]

## 2022-12-23 NOTE — ANESTHESIA PROCEDURE NOTES
Spinal Block    Date/Time: 12/23/2022 1:06 PM  Performed by: Alvin Chavira M.D.  Authorized by: Alvin Chavira M.D.     Start Time:  12/23/2022 1:06 PM  Reason for Block: primary anesthetic    patient identified, IV checked, site marked, risks and benefits discussed, surgical consent, monitors and equipment checked, pre-op evaluation and timeout performed    Patient Position:  Sitting  Prep: ChloraPrep, patient draped and sterile technique    Monitoring:  Blood pressure, continuous pulse oximetry and heart rate  Approach:  Midline  Location:  L3-4  Injection Technique:  Single-shot  Skin infiltration:  Lidocaine  Strength:  1%  Dose:  3ml  Needle Type:  Pencan  Needle Gauge:  25 G  CSF flowing pre/post injection:  Yes  Sensory Level:  T6

## 2022-12-23 NOTE — CARE PLAN
Problem: Risk for Infection and Impaired Wound Healing  Goal: Patient will remain free from infection  Outcome: Progressing     Problem: Risk for Fluid Imbalance  Goal: Patient's fluid volume balance will be maintained or improve  Outcome: Progressing   The patient is Stable - Low risk of patient condition declining or worsening         Progress made toward(s) clinical / shift goals:  Progressing

## 2022-12-23 NOTE — PROGRESS NOTES
"32 y.o.  EDC  (40w1d)    Pt arrives to Labor and Delivery with c/o contractions q4 min since 0300 that were \"intense\", but since arriving to hospital, they have gotten less painful. Pt states +FM, denies LOF/VB. Monitors applied x2. VSS. SBAR given to Toshia WARD. Orders received to check pt cervix. SVE FT/50/-4. Orders recheck pt in 1 hour.    0650 SVE unchanged. Pt now vomiting prior to cervical check. Toshia WARD updated, orders to give pt zofran PO (See MAR).  "

## 2022-12-24 LAB
BASOPHILS # BLD AUTO: 0.5 % (ref 0–1.8)
BASOPHILS # BLD AUTO: 0.6 % (ref 0–1.8)
BASOPHILS # BLD: 0.05 K/UL (ref 0–0.12)
BASOPHILS # BLD: 0.06 K/UL (ref 0–0.12)
EOSINOPHIL # BLD AUTO: 0.04 K/UL (ref 0–0.51)
EOSINOPHIL # BLD AUTO: 0.1 K/UL (ref 0–0.51)
EOSINOPHIL NFR BLD: 0.4 % (ref 0–6.9)
EOSINOPHIL NFR BLD: 1 % (ref 0–6.9)
ERYTHROCYTE [DISTWIDTH] IN BLOOD BY AUTOMATED COUNT: 53.4 FL (ref 35.9–50)
ERYTHROCYTE [DISTWIDTH] IN BLOOD BY AUTOMATED COUNT: 53.8 FL (ref 35.9–50)
HCT VFR BLD AUTO: 37 % (ref 37–47)
HCT VFR BLD AUTO: 39.2 % (ref 37–47)
HGB BLD-MCNC: 12.3 G/DL (ref 12–16)
HGB BLD-MCNC: 12.9 G/DL (ref 12–16)
IMM GRANULOCYTES # BLD AUTO: 0.03 K/UL (ref 0–0.11)
IMM GRANULOCYTES # BLD AUTO: 0.04 K/UL (ref 0–0.11)
IMM GRANULOCYTES NFR BLD AUTO: 0.3 % (ref 0–0.9)
IMM GRANULOCYTES NFR BLD AUTO: 0.4 % (ref 0–0.9)
LYMPHOCYTES # BLD AUTO: 1.7 K/UL (ref 1–4.8)
LYMPHOCYTES # BLD AUTO: 1.71 K/UL (ref 1–4.8)
LYMPHOCYTES NFR BLD: 16.6 % (ref 22–41)
LYMPHOCYTES NFR BLD: 16.8 % (ref 22–41)
MCH RBC QN AUTO: 33.7 PG (ref 27–33)
MCH RBC QN AUTO: 33.8 PG (ref 27–33)
MCHC RBC AUTO-ENTMCNC: 32.9 G/DL (ref 33.6–35)
MCHC RBC AUTO-ENTMCNC: 33.2 G/DL (ref 33.6–35)
MCV RBC AUTO: 101.6 FL (ref 81.4–97.8)
MCV RBC AUTO: 102.3 FL (ref 81.4–97.8)
MONOCYTES # BLD AUTO: 0.66 K/UL (ref 0–0.85)
MONOCYTES # BLD AUTO: 0.73 K/UL (ref 0–0.85)
MONOCYTES NFR BLD AUTO: 6.4 % (ref 0–13.4)
MONOCYTES NFR BLD AUTO: 7.2 % (ref 0–13.4)
NEUTROPHILS # BLD AUTO: 7.48 K/UL (ref 2–7.15)
NEUTROPHILS # BLD AUTO: 7.82 K/UL (ref 2–7.15)
NEUTROPHILS NFR BLD: 74.1 % (ref 44–72)
NEUTROPHILS NFR BLD: 75.7 % (ref 44–72)
NRBC # BLD AUTO: 0 K/UL
NRBC # BLD AUTO: 0 K/UL
NRBC BLD-RTO: 0 /100 WBC
NRBC BLD-RTO: 0 /100 WBC
PLATELET # BLD AUTO: 156 K/UL (ref 164–446)
PLATELET # BLD AUTO: 168 K/UL (ref 164–446)
PMV BLD AUTO: 11.6 FL (ref 9–12.9)
PMV BLD AUTO: 12 FL (ref 9–12.9)
RBC # BLD AUTO: 3.64 M/UL (ref 4.2–5.4)
RBC # BLD AUTO: 3.83 M/UL (ref 4.2–5.4)
WBC # BLD AUTO: 10.1 K/UL (ref 4.8–10.8)
WBC # BLD AUTO: 10.3 K/UL (ref 4.8–10.8)

## 2022-12-24 PROCEDURE — 85025 COMPLETE CBC W/AUTO DIFF WBC: CPT | Mod: 91

## 2022-12-24 PROCEDURE — A9270 NON-COVERED ITEM OR SERVICE: HCPCS

## 2022-12-24 PROCEDURE — 36415 COLL VENOUS BLD VENIPUNCTURE: CPT

## 2022-12-24 PROCEDURE — 700111 HCHG RX REV CODE 636 W/ 250 OVERRIDE (IP)

## 2022-12-24 PROCEDURE — 770002 HCHG ROOM/CARE - OB PRIVATE (112)

## 2022-12-24 PROCEDURE — 700102 HCHG RX REV CODE 250 W/ 637 OVERRIDE(OP)

## 2022-12-24 RX ORDER — HYDROMORPHONE HYDROCHLORIDE 1 MG/ML
0.5 INJECTION, SOLUTION INTRAMUSCULAR; INTRAVENOUS; SUBCUTANEOUS ONCE
Status: DISCONTINUED | OUTPATIENT
Start: 2022-12-24 | End: 2022-12-24

## 2022-12-24 RX ORDER — HYDROMORPHONE HYDROCHLORIDE 1 MG/ML
1 INJECTION, SOLUTION INTRAMUSCULAR; INTRAVENOUS; SUBCUTANEOUS ONCE
Status: COMPLETED | OUTPATIENT
Start: 2022-12-24 | End: 2022-12-24

## 2022-12-24 RX ORDER — SIMETHICONE 125 MG
125 TABLET,CHEWABLE ORAL 3 TIMES DAILY PRN
Status: DISCONTINUED | OUTPATIENT
Start: 2022-12-24 | End: 2022-12-26 | Stop reason: HOSPADM

## 2022-12-24 RX ADMIN — OXYCODONE HYDROCHLORIDE 10 MG: 10 TABLET ORAL at 09:51

## 2022-12-24 RX ADMIN — ONDANSETRON 4 MG: 4 TABLET, ORALLY DISINTEGRATING ORAL at 18:15

## 2022-12-24 RX ADMIN — SIMETHICONE 125 MG: 125 TABLET, CHEWABLE ORAL at 16:26

## 2022-12-24 RX ADMIN — DOCUSATE SODIUM 100 MG: 100 CAPSULE, LIQUID FILLED ORAL at 16:25

## 2022-12-24 RX ADMIN — ACETAMINOPHEN 1000 MG: 500 TABLET ORAL at 06:09

## 2022-12-24 RX ADMIN — IBUPROFEN 800 MG: 800 TABLET, FILM COATED ORAL at 13:47

## 2022-12-24 RX ADMIN — OXYCODONE HYDROCHLORIDE 10 MG: 10 TABLET ORAL at 14:33

## 2022-12-24 RX ADMIN — SIMETHICONE 125 MG: 125 TABLET, CHEWABLE ORAL at 21:14

## 2022-12-24 RX ADMIN — IBUPROFEN 800 MG: 800 TABLET, FILM COATED ORAL at 05:29

## 2022-12-24 RX ADMIN — IBUPROFEN 800 MG: 800 TABLET, FILM COATED ORAL at 21:51

## 2022-12-24 RX ADMIN — ACETAMINOPHEN 1000 MG: 500 TABLET ORAL at 18:09

## 2022-12-24 RX ADMIN — OXYCODONE HYDROCHLORIDE 10 MG: 10 TABLET ORAL at 21:51

## 2022-12-24 RX ADMIN — OXYCODONE HYDROCHLORIDE 10 MG: 10 TABLET ORAL at 00:32

## 2022-12-24 RX ADMIN — HYDROMORPHONE HYDROCHLORIDE 1 MG: 1 INJECTION, SOLUTION INTRAMUSCULAR; INTRAVENOUS; SUBCUTANEOUS at 16:26

## 2022-12-24 RX ADMIN — MORPHINE SULFATE 1 MG: 10 INJECTION INTRAVENOUS at 21:12

## 2022-12-24 RX ADMIN — ACETAMINOPHEN 1000 MG: 500 TABLET ORAL at 12:09

## 2022-12-24 RX ADMIN — OXYCODONE HYDROCHLORIDE 10 MG: 10 TABLET ORAL at 18:15

## 2022-12-24 RX ADMIN — OXYCODONE HYDROCHLORIDE 10 MG: 10 TABLET ORAL at 05:29

## 2022-12-24 ASSESSMENT — EDINBURGH POSTNATAL DEPRESSION SCALE (EPDS)
I HAVE BLAMED MYSELF UNNECESSARILY WHEN THINGS WENT WRONG: YES, SOME OF THE TIME
THINGS HAVE BEEN GETTING ON TOP OF ME: NO, I HAVE BEEN COPING AS WELL AS EVER
I HAVE BEEN ABLE TO LAUGH AND SEE THE FUNNY SIDE OF THINGS: AS MUCH AS I ALWAYS COULD
I HAVE BEEN ANXIOUS OR WORRIED FOR NO GOOD REASON: HARDLY EVER
I HAVE LOOKED FORWARD WITH ENJOYMENT TO THINGS: AS MUCH AS I EVER DID
I HAVE FELT SCARED OR PANICKY FOR NO GOOD REASON: NO, NOT MUCH
THE THOUGHT OF HARMING MYSELF HAS OCCURRED TO ME: NEVER
I HAVE BEEN SO UNHAPPY THAT I HAVE HAD DIFFICULTY SLEEPING: NOT AT ALL
I HAVE FELT SAD OR MISERABLE: NO, NOT AT ALL
I HAVE BEEN SO UNHAPPY THAT I HAVE BEEN CRYING: NO, NEVER

## 2022-12-24 ASSESSMENT — PAIN DESCRIPTION - PAIN TYPE
TYPE: SURGICAL PAIN
TYPE: SURGICAL PAIN
TYPE: SURGICAL PAIN;ACUTE PAIN
TYPE: SURGICAL PAIN
TYPE: ACUTE PAIN
TYPE: SURGICAL PAIN
TYPE: ACUTE PAIN

## 2022-12-24 NOTE — PROGRESS NOTES
3978 - Received report from L&D RN. Orientated patient to room, call light and emergency light education provided. Assessment completed, fundus firm, lochia scant. Abdominal incision with mepilex silver dressing intact with new  quarter sized drainage, edges are marked. Plan of care reviewed, patient verbalized understanding. C/O of pain 7/10.

## 2022-12-24 NOTE — PROGRESS NOTES
Post op from repeat c/s this afternoon  I was called to evaluate blood on the mepelex dressing  She reports she feels fine, pain is moderately well controlled, she is breastfeeding currently, reports baby is doing great, She denies lightheadedness or dizziness    Vitals:    12/23/22 1535 12/23/22 1605 12/23/22 1715 12/23/22 2200   BP: 122/65 119/63 102/68 98/64   Pulse: 86 83 85 69   Resp: 18 18 18 18   Temp: (!) 35.8 °C (96.4 °F) (!) 35.8 °C (96.4 °F) 36.6 °C (97.8 °F) 36.6 °C (97.9 °F)   TempSrc: Temporal Temporal Temporal Temporal   SpO2: 97% 93% 96% 94%   Weight:       Height:         Lungs: CTAB  Abdomen: soft, normal postop tenderness  Mepelex dressing with an area of bright red blood saturation that covers about half the dressing (per RN this has been slowly expanding through out the day)    A- s/p repeat c/s  Blood on dressing    P- check CBC now  Will place pressure dressing and recheck in am, I do not feel that removing the dressing at this time is necessary and we will follow closely    [employer]

## 2022-12-24 NOTE — LACTATION NOTE
This note was copied from a baby's chart.  Mom is a 31 y/o P2 who delivered baby girl weighing 7 # 9 oz at 40.1 wks. Mom reports darker and enlarged areolas during pregnancy. Mom denies any breast surgeries, thyroid or fertility issues.   Mom was in a semi side lying position attempting to latch baby. LC sat mother upright in bed and placed baby in FB hold on left side with pillow support. Baby was fussy and bobbing and sweeping at breast but was unable to latch. LC placed baby back STS and baby crawled towards left breast and after several attempts, baby latched independently with minimal assist from LC. Pillows were placed for support and baby was covered lightly with a blanket. Ear,shoulder and hip were aligned and baby was tummy to tummy.   LC discussed feeding frequency and patterns. Reviewed voids and stools and changes over next several days. Encouraged parents to call for assist and for mom to spend time skin to skin with baby.  Basic breastfeeding education provided. Mom has a pump at home. [facility] hand expression video to view.  Lactation to follow up in the morning

## 2022-12-24 NOTE — PROGRESS NOTES
2045: Assessment done. Fundus firm with light lochia. Patient able to ambulate to bathroom lehman removed. New drainage on dressing. New drainage marked.    2230: New drainage on dressing. MD made aware. MD assessed and requested pressure dressing to be placed on existing dressing.

## 2022-12-24 NOTE — ANESTHESIA POSTPROCEDURE EVALUATION
Patient: Judy Cline Achari    Procedure Summary     Date: 22 Room / Location: [facility] OR 02 / SURGERY LABOR AND DELIVERY    Anesthesia Start: 1304 Anesthesia Stop: 1405    Procedure:  SECTION, REPEAT (Abdomen) Diagnosis:       Status post repeat low transverse  section      (Same, Del)    Surgeons: Amrita Levine M.D. Responsible Provider: Alvin Chavira M.D.    Anesthesia Type: spinal ASA Status: 2          Final Anesthesia Type: spinal  Last vitals  BP   Blood Pressure: 119/63    Temp   (!) 35.8 °C (96.4 °F)    Pulse   83   Resp   18    SpO2   93 %      Anesthesia Post Evaluation    Patient location during evaluation: PACU  Patient participation: complete - patient participated  Level of consciousness: awake and alert    Airway patency: patent  Anesthetic complications: no  Cardiovascular status: hemodynamically stable  Respiratory status: acceptable  Hydration status: euvolemic    PONV: none          There were no known notable events for this encounter.     Nurse Pain Score: 7 (NPRS)

## 2022-12-24 NOTE — PROGRESS NOTES
Obstetrics & Gynecology Post-Delivery Progress Note    Date of Service      32 y.o.  s/p  for repeat  section  Delivery date: 22      Subjective  Pt reports bleeding from incision site and increased pain. Bleeding was expanding yesterday but states she has not noticed increased bleeding this morning and no bleeding outside of the dressing. States pain subsides when she receives her PRN pain medication and comes back as the dose wears off. No fevers, chills. Nausea and vomiting have resolved since delivery of baby.     Pain: Well controlled  Bleeding: lochia moderate  Tolerating PO: yes  Voiding: without difficulty  Ambulating: yes  Passing flatus: No  Feeding: breastfeeding well      Objective  24hr VS:  Temp:  [35.8 °C (96.4 °F)-36.7 °C (98 °F)] 36.6 °C (97.8 °F)  Pulse:  [63-90] 63  Resp:  [18] 18  BP: ()/(53-68) 95/65  SpO2:  [90 %-97 %] 94 %    Physical Exam  Gen: well appearing, no apparent distress, resting comfortably in bed  CV: rrr, no m/r/g  Resp: CTAB, symmetric breath sounds  Abd: soft, nontender, nondistended  Fundus: firm, at umbilicus, and tender  Incision:  dressing with dark red strike through, minimal increased in drainage  from 12am marking (last drawn). Deferred removal of pressure dressing for tamponade  Ext: symmetric, no peripheral edema, calves nontender    Labs:  Reviewed    Medications  LR, , Intravenous, Once  oxytocin, 1,000 mL, Intravenous, Once  scopolamine, 1 Patch, Transdermal, Q72HRS  ibuprofen, 800 mg, Oral, Q8HRS  acetaminophen, 1,000 mg, Oral, Q6HRS      PRN medications: fentaNYL, LR, ibuprofen **FOLLOWED BY** [START ON 2022] ibuprofen, acetaminophen **FOLLOWED BY** [START ON 2022] acetaminophen, oxyCODONE immediate-release, oxyCODONE immediate release, ondansetron **OR** ondansetron, diphenhydrAMINE **OR** diphenhydrAMINE, docusate sodium, measles, mumps and rubella vaccine      Assessment/Plan  Judy Darden is a 32 y.o.yo   postpartum day #1  s/p  for repeat    - Post care:  Patient with increased bleeding and pain. Appears to have stopped.  Hgb stable.   - Will maintain pressure dressing for now, can remove in PM if bleeding is not increasing  - Pain: controlled, with increased pain as PRNs wearing off   - Rh+  - Method of Feeding: plans to breastfeed  - Method of Contraception: NA  VTE prophylaxis: SCDs    - Disposition: likely home PPD4       Alayna Snow M.D.

## 2022-12-24 NOTE — CARE PLAN
Problem: Altered Physiologic Condition  Goal: Patient physiologically stable as evidenced by normal lochia, palpable uterine involution and vitals within normal limits  Outcome: Progressing     Problem: Early Mobilization - Post Surgery  Goal: Early mobilization post surgery  Outcome: Progressing   The patient is Stable - Low risk of patient condition declining or worsening    Shift Goals  Clinical Goals: VSS, light lochia    Progress made toward(s) clinical / shift goals:  Patient fundus firm with light lochia. Patient is ambulating.

## 2022-12-24 NOTE — CARE PLAN
The patient is Stable - Low risk of patient condition declining or worsening    Shift Goals  Clinical Goals: vss ,lochia WNL      Problem: Psychosocial - Postpartum  Goal: Patient will verbalize and demonstrate effective bonding and parenting behavior  Outcome: Progressing  Note: MOB has shown adequate bonding with infant, provided skin to skin, and proper cares of infant.       Problem: Knowledge Deficit - Postpartum  Goal: Patient will verbalize and demonstrate understanding of self and infant care  Outcome: Progressing  Note: MOB demonstrates and verbalizes understanding on how to care for . Asks appropriate questions and calls for help when necessary. Education has been provided to patient.

## 2022-12-24 NOTE — PROGRESS NOTES
0830- Assessment completed. Fundus firm, lochia light. Adominal incision with pressure dressing intact, clean and dry. Plan of care reviewed with MOB, verbalized understanding. C/O pain 7/10       1430 - MD Snow at bedside. Abdominal pressure dressing and mepilex silver removed, Incision site observed by Edy STEWART no hematoma present. New mepilex silver dressing placed.       1530- notified MD Snow patient C/o  8/10 . MD ordered for a one time dose of 1mg dilaudid, 125 mg simethicone, and CBC.     1630- patient C/O 8/10 pain. 1mg dilaudid provided IM to right deltoid. Patient and support person educated to notify RN if patient has difficulty breathing or any signs or symptoms of respiratory depression or allergic reaction. Support person verbalizes understanding and will pull emergency cord if necessary.

## 2022-12-25 PROCEDURE — 770002 HCHG ROOM/CARE - OB PRIVATE (112)

## 2022-12-25 PROCEDURE — 700111 HCHG RX REV CODE 636 W/ 250 OVERRIDE (IP)

## 2022-12-25 PROCEDURE — 700102 HCHG RX REV CODE 250 W/ 637 OVERRIDE(OP)

## 2022-12-25 PROCEDURE — 700105 HCHG RX REV CODE 258: Performed by: OBSTETRICS & GYNECOLOGY

## 2022-12-25 PROCEDURE — A9270 NON-COVERED ITEM OR SERVICE: HCPCS

## 2022-12-25 PROCEDURE — 700102 HCHG RX REV CODE 250 W/ 637 OVERRIDE(OP): Performed by: OBSTETRICS & GYNECOLOGY

## 2022-12-25 PROCEDURE — A9270 NON-COVERED ITEM OR SERVICE: HCPCS | Performed by: OBSTETRICS & GYNECOLOGY

## 2022-12-25 RX ORDER — OXYCODONE HYDROCHLORIDE 5 MG/1
5 TABLET ORAL EVERY 6 HOURS PRN
Status: DISCONTINUED | OUTPATIENT
Start: 2022-12-25 | End: 2022-12-26 | Stop reason: HOSPADM

## 2022-12-25 RX ADMIN — OXYCODONE HYDROCHLORIDE 10 MG: 10 TABLET ORAL at 05:57

## 2022-12-25 RX ADMIN — ACETAMINOPHEN 1000 MG: 500 TABLET ORAL at 20:15

## 2022-12-25 RX ADMIN — ACETAMINOPHEN 1000 MG: 500 TABLET ORAL at 15:35

## 2022-12-25 RX ADMIN — SODIUM CHLORIDE, POTASSIUM CHLORIDE, SODIUM LACTATE AND CALCIUM CHLORIDE: 600; 310; 30; 20 INJECTION, SOLUTION INTRAVENOUS at 16:43

## 2022-12-25 RX ADMIN — SODIUM CHLORIDE, POTASSIUM CHLORIDE, SODIUM LACTATE AND CALCIUM CHLORIDE: 600; 310; 30; 20 INJECTION, SOLUTION INTRAVENOUS at 08:30

## 2022-12-25 RX ADMIN — ONDANSETRON 4 MG: 2 INJECTION INTRAMUSCULAR; INTRAVENOUS at 21:41

## 2022-12-25 RX ADMIN — OXYCODONE 5 MG: 5 TABLET ORAL at 17:54

## 2022-12-25 RX ADMIN — SIMETHICONE 125 MG: 125 TABLET, CHEWABLE ORAL at 05:57

## 2022-12-25 RX ADMIN — ACETAMINOPHEN 1000 MG: 500 TABLET ORAL at 01:57

## 2022-12-25 RX ADMIN — IBUPROFEN 800 MG: 800 TABLET, FILM COATED ORAL at 22:03

## 2022-12-25 RX ADMIN — OXYCODONE HYDROCHLORIDE 10 MG: 10 TABLET ORAL at 10:18

## 2022-12-25 RX ADMIN — IBUPROFEN 800 MG: 800 TABLET, FILM COATED ORAL at 15:35

## 2022-12-25 RX ADMIN — ACETAMINOPHEN 1000 MG: 500 TABLET ORAL at 08:45

## 2022-12-25 RX ADMIN — IBUPROFEN 800 MG: 800 TABLET, FILM COATED ORAL at 05:57

## 2022-12-25 RX ADMIN — OXYCODONE HYDROCHLORIDE 10 MG: 10 TABLET ORAL at 01:57

## 2022-12-25 ASSESSMENT — PAIN DESCRIPTION - PAIN TYPE
TYPE: SURGICAL PAIN

## 2022-12-25 NOTE — PROGRESS NOTES
Obstetrics & Gynecology Post-Delivery Progress Note    Date of Service      32 y.o.  s/p  for repeat  Delivery date:       Subjective  Pt reports pain is worse today than yesterday. She feels distended, is not passing gas or having BM. Is walking but limited due to pain. Voiding without difficulty, bleeding minimal.     Pain: Well controlled  Bleeding: lochia minimal  Tolerating PO: yes, no N/V but with decreased appetite due to pain  Voiding: without difficulty  Ambulating: yes  Passing flatus: No  Feeding: breastfeeding well      Objective  24hr VS:  Temp:  [36.3 °C (97.4 °F)-36.6 °C (97.8 °F)] 36.3 °C (97.4 °F)  Pulse:  [65-67] 67  Resp:  [16-18] 16  BP: ()/(62-68) 112/68  SpO2:  [96 %-98 %] 97 %    Physical Exam  Gen: well appearing, no apparent distress, resting comfortably in bed  CV: rrr, no m/r/g  Resp: CTAB, symmetric breath sounds  Abd: soft, nontender, mildly distended, without rebound or guarding  Fundus: firm and below umbilicus  Incision: dressing clean, dry, intact  Ext: symmetric, no peripheral edema, calves nontender    Labs:  Reviewed    Medications  scopolamine, 1 Patch, Transdermal, Q72HRS  ibuprofen, 800 mg, Oral, Q8HRS  acetaminophen, 1,000 mg, Oral, Q6HRS      PRN medications: simethicone, fentaNYL, LR, ibuprofen **FOLLOWED BY** [START ON 2022] ibuprofen, acetaminophen **FOLLOWED BY** [START ON 2022] acetaminophen, oxyCODONE immediate-release, oxyCODONE immediate release, ondansetron **OR** ondansetron, diphenhydrAMINE **OR** diphenhydrAMINE, docusate sodium, measles, mumps and rubella vaccine      Assessment/Plan  Judy Darden is a 32 y.o.yo  postpartum day #2  s/p  for repeat    - Post care:  Not passing gas, with decreased appetite 2/2 increased pain and bloating  - Concern for post-op ileus, will place patient NPO --AROBF; will advance diet once patient is passing gas    -Will monitor closely, consider imaging if worsening  pain/nausea/vomiting   - Pain:  Not controlled   - Rh+  - Method of Feeding: plans to breastfeed  - Method of Contraception: NA  VTE prophylaxis: SCDs    - Disposition: likely home PPD4       Alayna Snow M.D.

## 2022-12-25 NOTE — PROGRESS NOTES
Assessment completed, fundus firm, lochia light. Abdominal incision with  dressing intact. Plan of Care reviewed with patient and understanding verbalized. Patient will call if pain medication intervention needed.

## 2022-12-25 NOTE — PROGRESS NOTES
0750 - MD notified this RN to place patient on NPO diet okay to have sips of water with medication. Start IV line on patient with 125 ml/hr LR.    0830 - 20g IV catheter inserted on Left hand x2 attempt LR infusing at 125 ml/hr.     Assessment completed. Fundus firm, lochia light. Adominal incision with mepilex silver dressing intact clean and dry. Plan of care reviewed with MOB, verbalized understanding. C/O of pain.    1210- Gabriele STEWART notified this RN to discontinue oxy-10 medication and change medication orders for Oxy 5 PRN medications to q6h PRN.       1600- patient up and ambulating complains of pain 8/10. Was able to pass gas. Is going to walk around unit a couple more times. Patient request to advance diet.       1650- updated MD Snow of patients status. Patients bowel sounds normoactive in all four quadrants. MD Snow will come up to assess patient.

## 2022-12-25 NOTE — CARE PLAN
The patient is Stable - Low risk of patient condition declining or worsening    Shift Goals  Clinical Goals: maintian vitals    Progress made toward(s) clinical / shift goals:      Problem: Infection - Postpartum  Goal: Postpartum patient will be free of signs and symptoms of infection  Outcome: Progressing  Note: Patient remains free from signs and symptoms of infection, vital signs stable.       Problem: Altered Physiologic Condition  Goal: Patient physiologically stable as evidenced by normal lochia, palpable uterine involution and vitals within normal limits  Note: Fundus firm, lochia light, ambulating.

## 2022-12-25 NOTE — LACTATION NOTE
This note was copied from a baby's chart.  Lactation follow up support: Mom is laying with baby in bed and resting. LC placed baby flat on back and stated that if mom gets sleepy she should call for assisted and place baby back in bassinet. LC stated that having baby in bed is not safe if mom is drowsy  or planning on sleeping.  Mom verbally understands and asked for the bassinet to be placed closer to her.  Mom reports that er milk has come in and baby is latching well and she can hear swallows.  Mo has no concerns at this time.She has some pain control issues and will be staying one more night. LC stated she will follow up in the morning before discharge as needed.

## 2022-12-26 VITALS
OXYGEN SATURATION: 95 % | SYSTOLIC BLOOD PRESSURE: 88 MMHG | DIASTOLIC BLOOD PRESSURE: 49 MMHG | RESPIRATION RATE: 18 BRPM | WEIGHT: 200 LBS | TEMPERATURE: 97.4 F | BODY MASS INDEX: 32.14 KG/M2 | HEART RATE: 71 BPM | HEIGHT: 66 IN

## 2022-12-26 PROCEDURE — A9270 NON-COVERED ITEM OR SERVICE: HCPCS

## 2022-12-26 PROCEDURE — A9270 NON-COVERED ITEM OR SERVICE: HCPCS | Performed by: OBSTETRICS & GYNECOLOGY

## 2022-12-26 PROCEDURE — 700102 HCHG RX REV CODE 250 W/ 637 OVERRIDE(OP): Performed by: ANESTHESIOLOGY

## 2022-12-26 PROCEDURE — 700102 HCHG RX REV CODE 250 W/ 637 OVERRIDE(OP): Performed by: OBSTETRICS & GYNECOLOGY

## 2022-12-26 PROCEDURE — 700102 HCHG RX REV CODE 250 W/ 637 OVERRIDE(OP)

## 2022-12-26 PROCEDURE — A9270 NON-COVERED ITEM OR SERVICE: HCPCS | Performed by: ANESTHESIOLOGY

## 2022-12-26 RX ORDER — OXYCODONE HYDROCHLORIDE 5 MG/1
5 TABLET ORAL EVERY 6 HOURS PRN
Qty: 12 TABLET | Refills: 0 | Status: SHIPPED | OUTPATIENT
Start: 2022-12-26 | End: 2022-12-29

## 2022-12-26 RX ORDER — AMOXICILLIN 250 MG
1 CAPSULE ORAL
Qty: 60 TABLET | Refills: 0 | Status: SHIPPED | OUTPATIENT
Start: 2022-12-26

## 2022-12-26 RX ORDER — ACETAMINOPHEN 500 MG
1000 TABLET ORAL EVERY 6 HOURS PRN
Qty: 60 TABLET | Refills: 0 | Status: SHIPPED | OUTPATIENT
Start: 2022-12-26

## 2022-12-26 RX ORDER — IBUPROFEN 800 MG/1
800 TABLET ORAL EVERY 8 HOURS PRN
Qty: 30 TABLET | Refills: 0 | Status: SHIPPED | OUTPATIENT
Start: 2022-12-26

## 2022-12-26 RX ADMIN — OXYCODONE 5 MG: 5 TABLET ORAL at 08:08

## 2022-12-26 RX ADMIN — DOCUSATE SODIUM 100 MG: 100 CAPSULE, LIQUID FILLED ORAL at 08:07

## 2022-12-26 RX ADMIN — ACETAMINOPHEN 1000 MG: 500 TABLET ORAL at 11:50

## 2022-12-26 RX ADMIN — OXYCODONE 5 MG: 5 TABLET ORAL at 01:00

## 2022-12-26 RX ADMIN — IBUPROFEN 800 MG: 800 TABLET, FILM COATED ORAL at 13:10

## 2022-12-26 RX ADMIN — SCOPOLAMINE 1 PATCH: 1.5 PATCH, EXTENDED RELEASE TRANSDERMAL at 11:55

## 2022-12-26 RX ADMIN — IBUPROFEN 800 MG: 800 TABLET, FILM COATED ORAL at 05:40

## 2022-12-26 RX ADMIN — ACETAMINOPHEN 1000 MG: 500 TABLET ORAL at 05:39

## 2022-12-26 RX ADMIN — OXYCODONE 5 MG: 5 TABLET ORAL at 13:10

## 2022-12-26 ASSESSMENT — PAIN DESCRIPTION - PAIN TYPE
TYPE: SURGICAL PAIN
TYPE: ACUTE PAIN;SURGICAL PAIN
TYPE: SURGICAL PAIN

## 2022-12-26 NOTE — PROGRESS NOTES
0700: received report from Tish WADE. Assumed care.  0830: Patient assessment completed, Reviewed plan of care with patient and educated patient on discharge instructions. Verbalized understanding.   Discharge paperwork Reviewed with patient, verbalized understanding with no question.

## 2022-12-26 NOTE — PROGRESS NOTES
Assessment completed, fundus firm, lochia light. Abdominal incision with mepilex silver dressing intact. Plan of Care reviewed with patient and understanding verbalized. Patient will call if pain medication intervention needed.

## 2022-12-26 NOTE — LACTATION NOTE
This note was copied from a baby's chart.  Lactaion follow up: Report given that mom is nursing baby well. Mom states her milk came in and she feels baby is feeding well. Baby has gained weight and is voiding and stooling. Parents have a follow up appointment in the morning for baby to be assessed in office. LC asked if mother needed any help and mother declined and stated they are good. LC reminded mother that baby should not sleep in the bed with her for safety reasons. Mom said she will get up and change baby's diaper. Mom verbalizes understanding of safe sleep.

## 2022-12-26 NOTE — CARE PLAN
The patient is Stable - Low risk of patient condition declining or worsening    Shift Goals  Clinical Goals: maintain vitals    Progress made toward(s) clinical / shift goals:      Problem: Altered Physiologic Condition  Goal: Patient physiologically stable as evidenced by normal lochia, palpable uterine involution and vitals within normal limits  Outcome: Progressing  Note: Fundus firm, lochia light, ambulating.      Problem: Infection - Postpartum  Goal: Postpartum patient will be free of signs and symptoms of infection  Outcome: Progressing  Note: Patient remains free from signs and symptoms of infection, vital signs stable.

## 2022-12-26 NOTE — PROGRESS NOTES
POSTOPERATIVE  SECTION PROGRESS NOTE;        PATIENT ID:  NAME:  Judy Darden  MRN:               [id number]  YOB: 1990         32 y.o. female  at 40w1d POD#2 s/p repeat  section/mild postoperative ileus      Subjective: Passing gas no bowel movement yet pain somewhat better    Objective:    Vitals:    22 1815 22 0600 22 1738 22 0600   BP: 105/64 112/68 105/58 (!) 88/49   Pulse: 65 67 69 71   Resp: 18 16 14 18   Temp: 36.4 °C (97.6 °F) 36.3 °C (97.4 °F) 36.1 °C (96.9 °F) 36.3 °C (97.4 °F)   TempSrc: Temporal Temporal Temporal Temporal   SpO2: 98% 97%  95%   Weight:       Height:         General: No acute distress, resting comfortably in bed.  HEENT: normocephalic, nontraumatic, PERRLA, EOMI  Cardiovascular: Heart RRR with no murmurs, rubs or gallops. Distal Pulses 2+  Respiratory: symmetric chest expansion, lungs CTA bilaterally with no wheezes rales or rhonci  Abdomen: soft, mildly tender around incision which is clean, dry and intact, fundus firm, +BS-bowel sounds increased from yesterday and bowel distention resolving  Genitourinary: lochia light, denies excessive vaginal bleeding  Musculoskeletal: strength 5/5 in four extremities  Neuro: non focal with no numbness, tingling or changes in sensation      Recent Labs     22  0915 22  0608 22  1613   WBC 8.2 10.3 10.1   RBC 4.34 3.64* 3.83*   HEMOGLOBIN 14.6 12.3 12.9   HEMATOCRIT 43.7 37.0 39.2   .7* 101.6* 102.3*   MCH 33.6* 33.8* 33.7*   RDW 53.0* 53.4* 53.8*   PLATELETCT 168 156* 168   MPV 12.0 12.0 11.6   NEUTSPOLYS 81.10* 75.70* 74.10*   LYMPHOCYTES 12.10* 16.60* 16.80*   MONOCYTES 5.70 6.40 7.20   EOSINOPHILS 0.20 0.40 1.00   BASOPHILS 0.50 0.50 0.60     No results for input(s): SODIUM, POTASSIUM, CHLORIDE, CO2, GLUCOSE, BUN, CPKTOTAL in the last 72 hours.    Current Meds:   Current Facility-Administered Medications   Medication Dose Frequency Provider Last Rate Last Admin     oxyCODONE immediate-release (ROXICODONE) tablet 5 mg  5 mg Q6HRS PRN Mack Suazo M.D.   5 mg at 22 0100    simethicone (Mylicon) chewable tablet 125 mg  125 mg TID PRN Alayna Snow M.D.   125 mg at 22 0557    lactated ringers (LR) infusion   Continuous Amrita Levine M.D. 125 mL/hr at 22 1643 New Bag at 22 1643    oxytocin (Pitocin) infusion (for postpartum)  125 mL/hr Continuous Amrita Levine M.D.        fentaNYL (SUBLIMAZE) injection 100 mcg  100 mcg Q HOUR PRN Amrita Levine M.D.   100 mcg at 22 1211    scopolamine (TRANSDERM-SCOP) patch 1 Patch  1 Patch Q72HRS Alvin Chavira M.D.   1 Patch at 22 1127    lactated ringers infusion   PRN Alayna Snow M.D.        ibuprofen (MOTRIN) tablet 800 mg  800 mg Q8HRS Alayna Snow M.D.   800 mg at 22 0540    Followed by    ibuprofen (MOTRIN) tablet 800 mg  800 mg Q8HRS PRN Alayna Snow M.D.        acetaminophen (TYLENOL) tablet 1,000 mg  1,000 mg Q6HRS Alayna Snow M.D.   1,000 mg at 22 0539    Followed by    acetaminophen (TYLENOL) tablet 1,000 mg  1,000 mg Q6HRS PRN Alayna Snow M.D.        ondansetron (ZOFRAN) syringe/vial injection 4 mg  4 mg Q6HRS PRN Alayna Snow M.D.   4 mg at 221    Or    ondansetron (ZOFRAN ODT) dispertab 4 mg  4 mg Q6HRS PRN Alayna Snow M.D.   4 mg at 22 1815    diphenhydrAMINE (BENADRYL) tablet/capsule 25 mg  25 mg Q6HRS PRN Alayna Snow M.D.        Or    diphenhydrAMINE (BENADRYL) injection 25 mg  25 mg Q6HRS PRN Alayna Snow M.D.        docusate sodium (COLACE) capsule 100 mg  100 mg BID PRN Alayna Snow M.D.   100 mg at 22 1625    measles, mumps and rubella vaccine (MMR) injection 0.5 mL  0.5 mL Once PRN Alayna Snow M.D.       Last reviewed on 2022 10:46 AM by Charlotte Allen R.N.         Assessment:  32 y.o. female  at 40w1d POD#2 s/p repeat  section/mild postoperative  ileus    Plan:   Advance diet today  Discharge to home POD 3    Mack Suazo MD

## 2022-12-26 NOTE — CARE PLAN
The patient is Stable - Low risk of patient condition declining or worsening    Shift Goals  Clinical Goals: remain clinically stable    Progress made toward(s) clinical / shift goals:  Patient will resume a regular bowel function and regular bowel sounds with no discomfort or distention.   Patient will mobilize early post surgery with little to no pain.     Patient is not progressing towards the following goals:

## 2022-12-26 NOTE — DISCHARGE SUMMARY
Discharge Summary:     Date of Admission: 2022  Date of Discharge: 22      Admitting diagnosis:    1. Pregnancy @ 40w1d  2. Prior C/S x1      Discharge Diagnosis:   1. Status post repeat C/S.      Past Medical History:   Diagnosis Date    Anxiety     Indigestion     No active medical problems     Patient denies medical problems     Pregnant     Psychiatric disorder     anxiety     OB History    Para Term  AB Living   3 1 1   2 1   SAB IAB Ectopic Molar Multiple Live Births   1       0 1      # Outcome Date GA Lbr Barrett/2nd Weight Sex Delivery Anes PTL Lv   3 Term 22 40w1d  3.43 kg (7 lb 9 oz) F CS-LTranv Spinal N NIDA   2 AB 01/28/15 4w6d          1 SAB              Past Surgical History:   Procedure Laterality Date    PB KNEE SCOPE,REMV LOOSE BODY  oct 2007    OTHER  shingles , intestinal parasite     OTHER ORTHOPEDIC SURGERY      right knee     Penicillins    Patient Active Problem List   Diagnosis    Hyperemesis arising during pregnancy    Hyperemesis complicating pregnancy, antepartum    Hyperemesis gravidarum    Hypokalemia    Diarrhea    N&V (nausea and vomiting)    GBS (group B Streptococcus carrier), +RV culture, currently pregnant    Status post repeat low transverse  section       Hospital Course:   Pt is a 32 y.o. now  who presented on 2022 in early latent labor. Patient appropriately counseled and proceeded with repeat C/S. Single female infant was delivered via repeat C/S at 40w1d. Apgars 4, 9 at 1 and 5 minutes respectively.  ml.     Postpartum course notable for early ambulation, well managed pain, tolerance of diet, spontaneous voiding, and appropriate feeding of infant. She reports she is passing flatus. She has remained afebrile and blood pressure has been well controlled. All maternal questions and concerns addressed    Physical Exam:  Temp:  [36.1 °C (96.9 °F)-36.3 °C (97.4 °F)] 36.3 °C (97.4 °F)  Pulse:  [69-71] 71  Resp:   [14-18] 18  BP: ()/(49-58) 88/49  SpO2:  [95 %] 95 %  Physical Exam  General: well appearing, no apparent distress  CV: RRR, nl S1 and S2  Resp: Unlabored, symmetric chest rise, CTAB  Abdomen: Normoactive BS, soft, nontender, nondistended  Fundus: firm at level below umbilicus  Incision: well-approximated without drainage, appears to be healing well. Mepilex dressing in place without strike-thorough  Perineum: Deferred  Extremities: symmetric, no peripheral edema, calves nontender    Current Facility-Administered Medications   Medication Dose    oxyCODONE immediate-release (ROXICODONE) tablet 5 mg  5 mg    simethicone (Mylicon) chewable tablet 125 mg  125 mg    lactated ringers (LR) infusion      oxytocin (Pitocin) infusion (for postpartum)  125 mL/hr    fentaNYL (SUBLIMAZE) injection 100 mcg  100 mcg    scopolamine (TRANSDERM-SCOP) patch 1 Patch  1 Patch    lactated ringers infusion      ibuprofen (MOTRIN) tablet 800 mg  800 mg    acetaminophen (TYLENOL) tablet 1,000 mg  1,000 mg    Followed by    acetaminophen (TYLENOL) tablet 1,000 mg  1,000 mg    ondansetron (ZOFRAN) syringe/vial injection 4 mg  4 mg    Or    ondansetron (ZOFRAN ODT) dispertab 4 mg  4 mg    diphenhydrAMINE (BENADRYL) tablet/capsule 25 mg  25 mg    Or    diphenhydrAMINE (BENADRYL) injection 25 mg  25 mg    docusate sodium (COLACE) capsule 100 mg  100 mg    measles, mumps and rubella vaccine (MMR) injection 0.5 mL  0.5 mL       Recent Labs     12/24/22  0608 12/24/22  1613   WBC 10.3 10.1   RBC 3.64* 3.83*   HEMOGLOBIN 12.3 12.9   HEMATOCRIT 37.0 39.2   .6* 102.3*   MCH 33.8* 33.7*   MCHC 33.2* 32.9*   RDW 53.4* 53.8*   PLATELETCT 156* 168   MPV 12.0 11.6         Activity/ Discharge Instructions::   Discharge to home  Pelvic Rest x 6 weeks  No heavy lifting x4 weeks  Call or come to ED for: heavy vaginal bleeding, fever >100.4, severe abdominal pain, severe headache, chest pain, shortness of breath,  N/V, incisional drainage, or other  concerns.  PP Contraception: Undecided, considering vasectomy for FOB.        Follow up:  [facility] in 1 week for incision check for  delivery.     Discharge Meds:   Current Outpatient Medications   Medication Sig Dispense Refill    acetaminophen (TYLENOL) 500 MG Tab Take 2 Tablets by mouth every 6 hours as needed for Moderate Pain or Mild Pain. Do not exceed 4,000 mg in 24 hours 60 Tablet 0    ibuprofen (MOTRIN) 800 MG Tab Take 1 Tablet by mouth every 8 hours as needed for Mild Pain or Moderate Pain. 30 Tablet 0    senna-docusate (PERICOLACE OR SENOKOT S) 8.6-50 MG Tab Take 1 Tablet by mouth 2 times daily with meals as needed for Constipation. 60 Tablet 0    oxyCODONE immediate-release (ROXICODONE) 5 MG Tab Take 1 Tablet by mouth every 6 hours as needed for Severe Pain for up to 3 days. 12 Tablet 0           Sunita Galeano M.D.

## 2022-12-26 NOTE — DISCHARGE INSTRUCTIONS
Pelvic rest x6 weeks  Return for follow up visit in 1 week for incision check and in about 6 weeks for routine PP check.  Call or come to ED for: heavy vaginal bleeding, fever >100.4, severe abdominal pain, severe headache, chest pain, shortness of breath,  N/V, incisional drainage, or other concerns.  PATIENT DISCHARGE EDUCATION INSTRUCTION SHEET    REASONS TO CALL YOUR OBSTETRICIAN  Persistent fever, shaking, chills (Temperature higher than 100.4) may indicate you have an infection  Heavy bleeding: soaking more than 1 pad per hour; Passing clots an egg-sized clot or bigger may mean you have an postpartum hemorrhage  Foul odor from vagina or bad smelling or discolored discharge or blood  Breast infection (Mastitis symptoms); breast pain, chills, fever, redness or red streaks, may feel flu like symptoms  Urinary pain, burning or frequency  Incision that is not healing, increased redness, swelling, tenderness or pain, or any pus from episiotomy or  site may mean you have an infection  Redness, swelling, warmth, or painful to touch in the calf area of your leg may mean you have a blood clot  Severe or intensified depression, thoughts or feelings of wanting to hurt yourself or someone else   Pain in chest, obstructed breathing or shortness of breath (trouble catching your breath) may mean you are having a postpartum complication. Call your provider immediately   Headache that does not get better, even after taking medicine, a bad headache with vision changes or pain in the upper right area of your belly may mean you have high blood pressure or post birth preeclampsia. Call your provider immediately    HAND WASHING  All family and friends should wash their hands:  Before and after holding the baby  Before feeding the baby  After using the restroom or changing the baby's diaper    WOUND CARE  Ask your physician for additional care instructions. In general:   Incision:  May shower and pat incision dry    Keep the incision clean and dry  There should not be any opening or pus from the incision  Continue to walk at home 3 times a day   Do NOT lift anything heavier than your baby (over 10 pounds)  Encourage family to help participate in care of the  to allow rest and mom time to heal  Episiotomy/Laceration  May use tevin-spray bottle, witch hazel pads and dermaplast spray for comfort  Use tevin-spray bottle after urinating to cleanse perineal area  To prevent burning during urination spray tevin-water bottle on labial area   Pat perineal area dry until episiotomy/laceration is healed  Continue to use tevin-bottle until bleeding stops as needed  If have a 2nd degree laceration or greater, a Sitz bath can offer relief from soreness, burning, and inflammation   Sitz Bath   Sit in 6 inches of warm water and soak laceration as needed until the laceration heals    VAGINAL CARE AND BLEEDING  Nothing inside vagina for 6 weeks:   No sexual intercourse, tampons or douching  Bleeding may continue for 2-4 weeks. Amount and color may vary  Soaking 1 pad or more in an hour for several hours is considered heavy bleeding  Passing large egg sized blood clots can be concerning  If you feel like you have heavy bleeding or are having increasing amount of blood clots call your Obstetrician immediately  If you begin feeling faint upon standing, feeling sick to your stomach, have clammy skin, a really fast heartbeat, have chills, start feeling confused, dizzy, sleepy or weak, or feeling like you're going to faint call your Obstetrician immediately    HYPERTENSION   Preeclampsia or gestational hypertension are types of high blood pressure that only pregnant women can get. It is important for you to be aware of symptoms to seek early intervention and treatment. If you have any of these symptoms immediately call your Obstetrician    Vision changes or blurred vision   Severe headache or pain that is unrelieved with medication and will not  "go away  Persistent pain in upper abdomen or shoulder   Increased swelling of face, feet, or hands  Difficulty breathing or shortness of breath at rest  Urinating less than usual    URINATION AND BOWEL MOVEMENTS  Eating more fiber (bran cereal, fruits, and vegetables) and drinking plenty of fluids will help to avoid constipation  Urinary frequency and urgency after childbirth is normal  If you experience any urinary pain, burning or frequency call your provider    BIRTH CONTROL  It is possible to become pregnant at any time after delivery and while breastfeeding  Plan to discuss a method of birth control with your physician at your post delivery follow up visit    POSTPARTUM BLUES  During the first few days after birth, you may experience a sense of the \"blues\" which may include impatience, irritability or even crying. These feelings come and go quickly. However, as many as 1 in 10 women experience emotional symptoms known as postpartum depression.     POSTPARTUM DEPRESSION    May start as early as the second or third day after delivery or take several weeks or months to develop. Symptoms of \"blues\" are present, but are more intense: Crying spells; loss of appetite; feelings of hopelessness or loss of control; fear of touching the baby; over concern or no concern at all about the baby; little or no concern about your own appearance/caring for yourself; and/or inability to sleep or excessive sleeping. Contact your Obstetrician if you are experiencing any of these symptoms     PREVENTING SHAKEN BABY  If you are angry or stressed, PUT THE BABY IN THE CRIB, step away, take some deep breaths, and wait until you are calm to care for the baby. DO NOT SHAKE THE BABY. You are not alone, call a supporter for help.  [facility] 24/7 crisis call line (103-270-5180) or (1-544.656.7590)  You can also text them, text \"ANSWER\" (194402)  "

## (undated) DEVICE — TUBING CLEARLINK DUO-VENT - C-FLO (48EA/CA)

## (undated) DEVICE — SUTURE 0 VICRYL PLUS CT-1 - 36 INCH (36/BX)

## (undated) DEVICE — SET EXTENSION WITH 2 PORTS (48EA/CA) ***PART #2C8610 IS A SUBSTITUTE*****

## (undated) DEVICE — CATHETER IV NON-SAFETY 18 GA X 1 1/4 (50/BX 4BX/CA)

## (undated) DEVICE — GLOVE BIOGEL SZ 6.5 SURGICAL PF LTX (50PR/BX 4BX/CA)

## (undated) DEVICE — KIT  I.V. START (100EA/CA)

## (undated) DEVICE — SUTURE 3-0 VICRYL PLUS CT-1 - 36 INCH (36/BX)

## (undated) DEVICE — HEAD HOLDER JUNIOR/ADULT

## (undated) DEVICE — PACK C-SECTION (2EA/CA)

## (undated) DEVICE — WATER IRRIGATION STERILE 1000ML (12EA/CA)

## (undated) DEVICE — SODIUM CHL IRRIGATION 0.9% 1000ML (12EA/CA)

## (undated) DEVICE — TRAY SPINAL ANESTHESIA NON-SAFETY (10/CA)

## (undated) DEVICE — PAD LAP STERILE 18 X 18 - (5/PK 40PK/CA)

## (undated) DEVICE — ELECTRODE DUAL RETURN W/ CORD - (50/PK)

## (undated) DEVICE — STAPLER SKIN DISP - (6/BX 10BX/CA) VISISTAT

## (undated) DEVICE — SUTURE 1 CHROMIC CTX ETHICON - (36PK/BX)